# Patient Record
Sex: MALE | Race: OTHER | HISPANIC OR LATINO | URBAN - METROPOLITAN AREA
[De-identification: names, ages, dates, MRNs, and addresses within clinical notes are randomized per-mention and may not be internally consistent; named-entity substitution may affect disease eponyms.]

---

## 2023-06-13 ENCOUNTER — EMERGENCY (EMERGENCY)
Facility: HOSPITAL | Age: 53
LOS: 1 days | Discharge: HOME | End: 2023-06-13
Attending: EMERGENCY MEDICINE
Payer: SELF-PAY

## 2023-06-13 VITALS
TEMPERATURE: 98 F | WEIGHT: 169.76 LBS | RESPIRATION RATE: 18 BRPM | OXYGEN SATURATION: 97 % | DIASTOLIC BLOOD PRESSURE: 77 MMHG | HEIGHT: 66 IN | SYSTOLIC BLOOD PRESSURE: 173 MMHG | HEART RATE: 76 BPM

## 2023-06-13 PROCEDURE — 99053 MED SERV 10PM-8AM 24 HR FAC: CPT

## 2023-06-13 PROCEDURE — 99285 EMERGENCY DEPT VISIT HI MDM: CPT

## 2023-06-13 RX ORDER — ACETAMINOPHEN 500 MG
650 TABLET ORAL ONCE
Refills: 0 | Status: COMPLETED | OUTPATIENT
Start: 2023-06-13 | End: 2023-06-14

## 2023-06-13 NOTE — ED PROVIDER NOTE - PHYSICAL EXAMINATION
GCS 15, no raccoon eyes, no Battles sign, no scalp step off deformities.  No cervical, thoracic or lumbosacral midline bony deformities,  +rotation and flexion-extension of neck and truncal area intact.   Rights shoulder and hand tenderness distal radial and ulnar pulses intact, cap refill < 2 seconds, full range of motion of arm +elbow flexion/extension/supination and pronation intact, +flexion and extension of all digits at DIP and PIP.  Right hip tenderness  no bony deformities, no leg length discrepancy, femoral and pedal pulses intact, cap refill  < 2 secs.

## 2023-06-13 NOTE — ED PROVIDER NOTE - CARE PLAN
1 Principal Discharge DX:	Contusion, hip  Secondary Diagnosis:	Contusion of shoulder  Secondary Diagnosis:	Head injury  Secondary Diagnosis:	Fall

## 2023-06-13 NOTE — ED PROVIDER NOTE - OBJECTIVE STATEMENT
53-year-old male patient states he accidentally fell off ladder at 3:30 PM.  Chief complaint of right shoulder right hand, right hip tenderness.  Patient also states has headache.  Patient denies LOC.  Patient states he is not on any anticoagulants.

## 2023-06-13 NOTE — ED PROVIDER NOTE - CLINICAL SUMMARY MEDICAL DECISION MAKING FREE TEXT BOX
CT reported No acute intracranial pathology. No acute fracture or subluxation of the cervical spine.  Xrays no acute displaced fxs. CT reported No acute intracranial pathology. No acute fracture or subluxation of the cervical spine.  Xrays no acute displaced fxs.    Pt is well appearing, has no new complaints and able to walk with normal gait. Pt is stable for discharge and follow up with medical doctor. Pt educated on care and need for follow up. Discussed anticipatory guidance and return precautions. Questions answered. I had a detailed discussion with the patient regarding the historical points, exam findings, and any diagnostic results supporting the discharge diagnosis.     I used  #[id number].

## 2023-06-13 NOTE — ED ADULT TRIAGE NOTE - CHIEF COMPLAINT QUOTE
speak Icelandic ID # [id number] patient reports "I fell from work at around 3:30pm about 8-10 feet from height c/o  rt. side body pain, rt. leg pain. dizziness & difficulty walking. denies LOC & head trauma

## 2023-06-13 NOTE — ED PROVIDER NOTE - PATIENT PORTAL LINK FT
You can access the FollowMyHealth Patient Portal offered by [facility] by registering at the following website: [email or web address]. By joining [facility]’s FollowMyHealth portal, you will also be able to view your health information using other applications (apps) compatible with our system.

## 2023-06-13 NOTE — ED PROVIDER NOTE - NSFOLLOWUPCLINICS_GEN_ALL_ED_FT
[facility]  Internal Medicine  [address] [place] [address]  Phone: [phone]  Fax: [phone]

## 2023-06-13 NOTE — ED PROVIDER NOTE - NSFOLLOWUPINSTRUCTIONS_ED_ALL_ED_FT
Yash contusión es un hematoma profundo. Es el resultado de yash lesión que causa sangrado debajo de la piel. Los síntomas de hematoma incluyen dolor, hinchazón y cambio de color en la piel. La piel puede ponerse ness, morada o amarilla.    Siga estas indicaciones en rodriguez casa:  Control del dolor, el entumecimiento y la hinchazón    Bag of ice on a towel on the skin.  Puede usar RHCE. Kouts significa:  Hacer reposo.  Aplicar hielo.  Aplicar presión, o compresión.  Poner en alto, o elevar, la jose maria lesionada.  Para seguir german método, laura lo siguiente:  Mantenga la jose maria de la lesión en reposo.  Aplique hielo sobre la jose maria lesionada, si se lo indican.  Ponga el hielo en yash bolsa plástica.  Coloque yash toalla entre la piel y la bolsa.  Coloque el hielo tramaine 20 minutos, 2 a 3 veces al día.  Si se lo indican, ejerza yash presión suave (compresión) en la jose maria de la lesión con yash venda elástica. Asegúrese de que la venda no esté muy ajustada. Si siente hormigueo o adormecimiento en la jose maria, quítesela y vuelva a colocarla sam se lo haya indicado el médico.  Si es posible, cuando esté sentado o acostado, levante (eleve) la jose maria lesionada por encima del nivel del corazón.  Indicaciones generales    Use los medicamentos de venta paulo y los recetados solamente sam se lo haya indicado el médico.  Concurra a todas las visitas de seguimiento sam se lo haya indicado el médico. Kouts es importante.  Comuníquese con un médico si:  Los síntomas no mejoran después de varios días de tratamiento.  Cassie síntomas empeoran.  Tiene dificultad para  la jose maria de la lesión.  Solicite ayuda inmediatamente si:  Siente mucho dolor.  Pierde la sensibilidad (adormecimiento) en yash mano o un pie.  La mano o el pie están pálidos o fríos.  Resumen  Yash contusión es un hematoma profundo. Es el resultado de yash lesión que causa sangrado debajo de la piel.  Los síntomas de hematoma incluyen dolor, hinchazón y cambio de color en la piel. La piel puede ponerse ness, morada o amarilla.  El tratamiento para esta afección incluye hacer reposo, aplicarse hielo, compresión y elevar la jose maria afectada. Kouts también se denomina RHCE. Es posible que le den analgésicos de venta paulo.  Comuníquese con un médico si no se siente mejor o si se siente peor. Solicite ayuda de inmediato si tiene dolor muy intenso, si pierde la sensibilidad en yash mano o en un pie, o si la jose maria se torna pálida o fría.  Esta información no tiene sam fin reemplazar el consejo del médico. Asegúrese de hacerle al médico cualquier pregunta que tenga.

## 2023-06-14 VITALS
HEART RATE: 71 BPM | RESPIRATION RATE: 18 BRPM | TEMPERATURE: 98 F | DIASTOLIC BLOOD PRESSURE: 89 MMHG | OXYGEN SATURATION: 97 % | SYSTOLIC BLOOD PRESSURE: 166 MMHG

## 2023-06-14 PROCEDURE — 73502 X-RAY EXAM HIP UNI 2-3 VIEWS: CPT | Mod: 26,RT

## 2023-06-14 PROCEDURE — 99284 EMERGENCY DEPT VISIT MOD MDM: CPT | Mod: 25

## 2023-06-14 PROCEDURE — 70450 CT HEAD/BRAIN W/O DYE: CPT | Mod: MA

## 2023-06-14 PROCEDURE — 73130 X-RAY EXAM OF HAND: CPT | Mod: 26,RT

## 2023-06-14 PROCEDURE — 70450 CT HEAD/BRAIN W/O DYE: CPT | Mod: 26,MA

## 2023-06-14 PROCEDURE — 73030 X-RAY EXAM OF SHOULDER: CPT | Mod: 26,RT

## 2023-06-14 PROCEDURE — 73502 X-RAY EXAM HIP UNI 2-3 VIEWS: CPT

## 2023-06-14 PROCEDURE — 73030 X-RAY EXAM OF SHOULDER: CPT

## 2023-06-14 PROCEDURE — 72125 CT NECK SPINE W/O DYE: CPT | Mod: 26,MA

## 2023-06-14 PROCEDURE — 72125 CT NECK SPINE W/O DYE: CPT | Mod: MA

## 2023-06-14 PROCEDURE — 73130 X-RAY EXAM OF HAND: CPT

## 2023-06-14 RX ORDER — IBUPROFEN 200 MG
1 TABLET ORAL
Qty: 20 | Refills: 0
Start: 2023-06-14

## 2023-06-14 RX ADMIN — Medication 650 MILLIGRAM(S): at 01:42

## 2023-06-14 NOTE — ED ADULT NURSE NOTE - OBJECTIVE STATEMENT
speak Georgian ID # [id number] patient reports "I fell from work at around 3:30pm about 8-10 feet from height c/o  rt. side body pain, rt. leg pain. dizziness & difficulty walking. denies LOC & head trauma

## 2023-06-14 NOTE — ED ADULT NURSE NOTE - NSFALLRISKINTERV_ED_ALL_ED

## 2023-06-14 NOTE — ED ADULT NURSE NOTE - CHIEF COMPLAINT QUOTE
speak English ID # [id number] patient reports "I fell from work at around 3:30pm about 8-10 feet from height c/o  rt. side body pain, rt. leg pain. dizziness & difficulty walking. denies LOC & head trauma